# Patient Record
Sex: FEMALE | Race: WHITE | URBAN - METROPOLITAN AREA
[De-identification: names, ages, dates, MRNs, and addresses within clinical notes are randomized per-mention and may not be internally consistent; named-entity substitution may affect disease eponyms.]

---

## 2023-07-24 ENCOUNTER — HOSPITAL ENCOUNTER (OUTPATIENT)
Dept: SPEECH THERAPY | Age: 78
Setting detail: THERAPIES SERIES
Discharge: HOME | End: 2023-07-24
Payer: MEDICARE

## 2023-07-24 PROCEDURE — 92523 SPEECH SOUND LANG COMPREHEN: CPT

## 2023-07-24 NOTE — THERAPY EVALUATION
[address] [facility] Speech Therapy  [address] [place] [address], [address] [facility], [address] [place] [address]  Phone: [phone]   Fax: [phone]     Mrmzbr-Jimfrwgz-Pyzxroohe Evaluation  Speech Therapy Certification      Dear London Woodard DO,    We had the pleasure of evaluating the following patient for speech therapy services at [facility]. A summary of our findings can be found in the initial assessment below. This includes our plan of care. If you have any questions or concerns regarding these findings, please do not hesitate to contact me at the office phone number checked above. Thank you for the referral.       Physician Signature:_______________________________Date:__________________  By signing above (or electronic signature), therapist's plan is approved by physician            Patient: Veronica Kaiser   : 1945   MRN: [id number]  Referring Physician:        Evaluation Date: 2023      Medical Diagnosis Information:  Tomy Cox (ICD-10-CM) - Parkinson's disease  Insurance information: Medicare Parts A and B      Precautions/ Contra-indications: n/a  Latex Allergy:  [x]NO      []YES  Preferred Language for Healthcare:   [x]English       []other:    SUBJECTIVE:  ONSET:  Pt reports Parkinson's diagnosis \"~5 years ago. \"    Recent MRI Brain/Head CT: not available    History/Prior Level of Function:      Current Level of Function:  independent  Living Status: lives with spouse (Leola Coker)  Occupation/School: retired, accounting, associate's degree  Medication Management:  Primary, uses pill organizer  Finance Management:  Primary  Hearing: WFL  Vision: WFL    Relevant Medical History:  [x] Patient history, allergies, meds reviewed. Medical chart reviewed. See intake form. Review Of Systems (ROS):  [x]Performed Review of systems (Integumentary, CardioPulmonary, Neurological) by intake and observation. Intake form has been scanned into medical record.  Patient has been instructed to

## 2023-07-26 ENCOUNTER — HOSPITAL ENCOUNTER (OUTPATIENT)
Dept: OCCUPATIONAL THERAPY | Age: 78
Setting detail: THERAPIES SERIES
Discharge: HOME | End: 2023-07-26
Payer: MEDICARE

## 2023-07-26 ENCOUNTER — HOSPITAL ENCOUNTER (OUTPATIENT)
Dept: PHYSICAL THERAPY | Age: 78
Setting detail: THERAPIES SERIES
Discharge: HOME | End: 2023-07-26
Payer: MEDICARE

## 2023-07-26 PROCEDURE — 97112 NEUROMUSCULAR REEDUCATION: CPT

## 2023-07-26 PROCEDURE — 97530 THERAPEUTIC ACTIVITIES: CPT

## 2023-07-26 PROCEDURE — 97163 PT EVAL HIGH COMPLEX 45 MIN: CPT

## 2023-07-26 PROCEDURE — 97166 OT EVAL MOD COMPLEX 45 MIN: CPT

## 2023-07-26 NOTE — THERAPY EVALUATION
NT  Alternating Toe Tapping:       Impaired    Flexibility     Hamstrings: decreased on R and decreased on L    Functional Mobility    Transitional Movement Assistance Level   Rolling to left side Modified Independent- increased time   Rolling to right side Modified Independent- increased difficulty, incomplete roll   Scooting in bed Modified Independent- increased difficulty   Supine to sit Modified Independent-8.3 sec   Sit to supine Modified Independent-6.5 sec   Sit to stand Modified Independent w/ L UE support, mult attempts w/ wide BARON   Stand to sit Modified Independent   Bed to chair  Modified Independent with stepping/ambulating, festinating, increased time, then progresses to wide BARON     Gait Testing:     Gait   Level of Assistance needed:     Modified Independent  Gait Deviations (firm surface/linoleum):     parkinsonian pattern, freezing any change in surface/color of janice, thresholds, doorways, discontinuous turning, reduced trunk/head rotation, inconsistent sequencing w/ SPC, decreased step length (L shorter than R),   Assistive Device Used:     Single point cane on right    Stairs  Level of Assistance needed:      Modified Independent  Pt able to negotiate up/down 4 stairs:  reciprocal pattern and handrail bilaterally  Assistive Device Used:      no AD  Deficits noted:      Decreased foot clearance on ascent; L side retracted during descent w/ minimal step length and foot clearance      Balance  Static Sitting:  Good   Dynamic Sitting: Good -   Static Standing:  Fair   Dynamic Standing: Fair -    Falls Risk Assessment (30 days):   [] Falls Risk assessed and no intervention required. [x] Falls Risk assessed and Patient requires intervention due to being higher risk   []Tinetti Balance:    Total Score:        Balance:            Gait:         Disability Index:       Risk of Falls:   []Low (> 24)   []Mod (19-23)   []High (< 18)    []10m walk speed        []Functional Gait Assessment

## 2023-07-26 NOTE — THERAPY EVALUATION
[facility] Outpatient Therapy  [address] [employer], [address] [facility], [address] [place] [address]  Phone: [phone]   Fax: [phone]                                               Occupational Therapy Certification    Dear Dr. Susanne Frey DO ,    We had the pleasure of evaluating the following patient for occupational therapy services at [facility]. A summary of our findings can be found in the initial assessment below. This includes our plan of care. If you have any questions or concerns regarding these findings, please do not hesitate to contact me at the office phone number checked above.   Thank you for the referral.         Physician Signature:_______________________________Date:__________________  By signing above (or electronic signature), therapist's plan is approved by physician      Patient: Albino Tidwell   : 1945  MRN: [id number]  Referring Physician: Susanne Frey DO       Evaluation Date: 2023       Medical Diagnosis Information: Parkinson's disease [G20]   Treatment Diagnosis Information: R27.8 (ICD-10-CM) other lack of coordination, R53.1 (ICD-10-CM) Weakness                                       Insurance information: Medicare A & B    Onset Date: ~5 years ago - diagnosed with PD      Follow-Up Appointments: Deep brain stimulator consult with [facility] 2023     Precautions/ Contra-indications: fall risk  Latex Allergy:  [x]NO      []YES   Preferred Language for Healthcare:   [x]English       []other:  C-SSRS Triggered by Intake questionnaire (Past 2 wk assessment):   [x] No, Questionnaire did not trigger screening.   [] Yes, Patient intake triggered further evaluation      [] C-SSRS Screening completed  [] PCP notified via Plan of Care  [] Emergency services notified    SUBJECTIVE:     History of Present Illness:   Pt reported diagnosed with PD approximately 5 years ago with recent decline in function in the past 3 months requiring

## 2023-07-26 NOTE — FLOWSHEET NOTE
[facility] Outpatient Therapy  [address] [place] [address], [address] [facility], [address] [place] [address] Avenue  Phone: [phone]   Fax: [phone]    Occupational Therapy Treatment Note/ Progress Report:     Date:  2023    Patient Name:  Winsome Fernández    :  1945  MRN: [id number]      Medical/Treatment Diagnosis Information:  Parkinson's disease [G20]   R27.8 (ICD-10-CM) other lack of coordination, R53.1 (ICD-10-CM) Weakness           Insurance/Certification information: Medicare A & B   Physician Information: Susanna Woo DO    Plan of care signed:    No    Date of Patient follow up with Physician:      Progress Report: []  Yes  [x]  No     Date Range for reporting period:  Beginnin2023  Ending:      Progress report due:      Recertification due (POC duration/ or 90 days whichever is less): 2023     Visit # Insurance Allowable Auth Needed    BMN No     Latex Allergy:  [x]NO      []YES  Preferred Language for Healthcare:   [x]English       []other:  Functional Scale: UEFS (2021)  -- 57.5/100    Pain level:   Initial:  Post Treatment:     RESTRICTIONS/PRECAUTIONS: fall risk    SUBJECTIVE:  See eval       OBJECTIVE: See eval  Observation:   Test measurements:      DAILY EXERCISES:  Exercises in bold performed in department today. Items not bolded are carried forward from prior visits for continuity of the record. Exercise Repetitions Effort Rating Comments HEP   Sustained Movements (sitting) Floor to Ceiling       [x]    Side to Side       [x]   Multidirectional Repetitive Movements (standing) Forward step/reach            [x]    Sideways step/reach          [x]    Backwards step/reach              [x]    Forward/Backward Rock & Reach          [x]    Side to Side [address]            [x]      [address] [place]       MAXIMAL FUNCTIONAL COMPONENT MOVEMENTS   Functional Task Repetitions Effort Comments   1. Sit to Stand      2.  Handwriting/bed

## 2023-08-07 ENCOUNTER — HOSPITAL ENCOUNTER (OUTPATIENT)
Dept: PHYSICAL THERAPY | Age: 78
Setting detail: THERAPIES SERIES
Discharge: HOME | End: 2023-08-07
Payer: MEDICARE

## 2023-08-07 ENCOUNTER — APPOINTMENT (OUTPATIENT)
Dept: PHYSICAL THERAPY | Age: 78
End: 2023-08-07
Payer: MEDICARE

## 2023-08-07 PROCEDURE — 97530 THERAPEUTIC ACTIVITIES: CPT

## 2023-08-07 NOTE — FLOWSHEET NOTE
Progressing: [] Met: [] Not Met: [] Adjusted  3. Pt will improve 5 time sit to stand to 14.8 sec for reduced fall risk. [] Progressing: [] Met: [] Not Met: [] Adjusted    Long Term Goals: To be achieved in: 5 weeks  1. Pt will improve ABC scale to 50% to aid in reduced fall risk (<69% is predictive of recurrent falls in PD pop). [] Progressing: [] Met: [] Not Met: [] Adjusted  2. Pt to perform transfers via stepping/ambulating with or without cane mod I without freezing >75% trials. [] Progressing: [] Met: [] Not Met: [] Adjusted  3. Pt will ambulate on level surfaces without AD mod I for >300' w/ freezing, <75% of the time when initiating, nearly normal step length, full foot clearance and B arm swing. [] Progressing: [] Met: [] Not Met: [] Adjusted  4. Pt will improve Cox to 50/56 to demonstrate reduced fall risk. [] Progressing: [] Met: [] Not Met: [] Adjusted  5. Pt independent with HEP. [] Progressing: [] Met: [] Not Met: [] Adjusted    Plan:  4 days per week for 4 Weeks (to be divided between PT and OT to address all aspects of POC), will be delayed 1 week in starting POC due to staff scheduling conflicts, pt agreeable.    [x] Continue per plan of care [] Alter current plan (see comments)  [] Plan of care initiated [] Hold pending MD visit [] Discharge        Electronically signed by:  Nasario Alpers, PT,DPT

## 2023-08-08 ENCOUNTER — HOSPITAL ENCOUNTER (OUTPATIENT)
Dept: OCCUPATIONAL THERAPY | Age: 78
Setting detail: THERAPIES SERIES
Discharge: HOME | End: 2023-08-08
Payer: MEDICARE

## 2023-08-08 PROCEDURE — 97112 NEUROMUSCULAR REEDUCATION: CPT

## 2023-08-08 PROCEDURE — 97530 THERAPEUTIC ACTIVITIES: CPT

## 2023-08-08 PROCEDURE — 97110 THERAPEUTIC EXERCISES: CPT

## 2023-08-08 NOTE — FLOWSHEET NOTE
[facility] Outpatient Therapy  [address] [place] [address], [address] [facility], [address] [place] [address] Avenue  Phone: [phone]   Fax: [phone]    Occupational Therapy Treatment Note/ Progress Report:     Date:  2023    Patient Name:  Reece Murray    :  1945  MRN: [id number]      Medical/Treatment Diagnosis Information:  Parkinson's disease [G20]   R27.8 (ICD-10-CM) other lack of coordination, R53.1 (ICD-10-CM) Weakness           Insurance/Certification information: Medicare A & B   Physician Information: Leobardo Muñoz DO    Plan of care signed:    No    Date of Patient follow up with Physician:      Progress Report: []  Yes  [x]  No     Date Range for reporting period:  Beginnin2023  Ending:      Progress report due:      Recertification due (POC duration/ or 90 days whichever is less): 2023     Visit # Insurance Allowable Auth Needed    BMN No     Latex Allergy:  [x]NO      []YES  Preferred Language for Healthcare:   [x]English       []other:  Functional Scale: UEFS (2021)  -- 57.5/100    Pain level:   Initial: 0/10  Post Treatment: 0/10     RESTRICTIONS/PRECAUTIONS: fall risk    SUBJECTIVE:  pt reported being \"worn out last night\"       OBJECTIVE:   Observation: mild-moderate difficulty with trunk and cervical rotation  Test measurements:      DAILY EXERCISES:  Exercises in bold performed in department today. Items not bolded are carried forward from prior visits for continuity of the record. Chair used for standing exercises for UE support     Exercise Repetitions Effort Rating Comments HEP   Sustained Movements (sitting) Floor to Ceiling  8 9 Modeled with patient but no verbal cues needed following initial instruction     [x]    Side to Side  10 5 Difficulty with trunk rotation to the R. Requires assist with shaping.  Difficulty with fully extending back leg     [x]   Multidirectional Repetitive Movements (standing) Forward step/reach     8 8 Mod-max VCs for

## 2023-08-09 ENCOUNTER — HOSPITAL ENCOUNTER (OUTPATIENT)
Dept: PHYSICAL THERAPY | Age: 78
Setting detail: THERAPIES SERIES
Discharge: HOME | End: 2023-08-09
Payer: MEDICARE

## 2023-08-09 PROCEDURE — 97530 THERAPEUTIC ACTIVITIES: CPT

## 2023-08-09 NOTE — FLOWSHEET NOTE
Physical Therapy Treatment Note   Date:  2023    Patient Name:  Foreign Damico  Diagnosis:      :  1945 Treatment Diagnosis:   R26 Abnormality of gait and mobility due to PD   MRN: [id number]        Restrictions/Precautions:  DM2, beta-blocker, tremor and dyskinesias Insurance/Certification information:          Visit# / total visits:  3/17 (9PT/8OT) Missed visits:   0   POC expiration date:  10/26/23 Plan of care signed (Y/N): Y       Initial Pain level: 4/10 Low back  Post Tx Pain Rating:  3/10   Preferred Language for Healthcare:   [x]English       []      Subjective:  Pt reports being a little sore all over from new exercises. Not able to get through all exercises last night, legs \"just give out later in the day\". Objective:    Pt amb w/  parkinsonian pattern, freezing at any change in surface/color of janice, thresholds, doorways, discontinuous turning, reduced trunk/head rotation, inconsistent sequencing w/ SPC, decreased step length (L shorter than R), Single point cane on right      DAILY EXERCISES:  Single UE, sometimes B UE support required for standing exercise + CGA/min A due to posterior LOB. Seated rest break x2   Exercise Repetitions Effort Rating Comments   Sustained Movements (sitting) Floor to Ceiling  8 1     Side to Side  8 each  5 Tactile cues for increased hip and trunk rotation L > R   Multidirectional Repetitive Movements (standing) Forward step/reach     8 each 7 Single UE support, mult LOB, min A required, cues for complete rotation to midline    Sideways step/reach   8 7 CGA, posterior LOB on first 2-3 reps each side.      Backwards step/reach     8 6 singe UE support, min A at hips for weight shift initially and cues to dorsiflex front foot     Forward/Backward [address]   10 each  3 Single UE support throughout     Side to [place]     10 each  5 Cues to increase front LE rotation and complete rotation to front      [address]

## 2023-08-10 ENCOUNTER — APPOINTMENT (OUTPATIENT)
Dept: PHYSICAL THERAPY | Age: 78
End: 2023-08-10
Payer: MEDICARE

## 2023-08-10 ENCOUNTER — HOSPITAL ENCOUNTER (OUTPATIENT)
Dept: OCCUPATIONAL THERAPY | Age: 78
Setting detail: THERAPIES SERIES
Discharge: HOME | End: 2023-08-10
Payer: MEDICARE

## 2023-08-10 PROCEDURE — 97110 THERAPEUTIC EXERCISES: CPT

## 2023-08-10 PROCEDURE — 97112 NEUROMUSCULAR REEDUCATION: CPT

## 2023-08-10 PROCEDURE — 97530 THERAPEUTIC ACTIVITIES: CPT

## 2023-08-10 NOTE — FLOWSHEET NOTE
promoting relaxation,  increasing ROM, reducing/eliminating soft tissue swelling/inflammation/restriction, improving soft tissue extensibility and allowing for proper ROM for normal function with self-care, functional mobility, transfers, reaching and lifting    Modalities:     [] Electrical Stimulation (42895):     [] Ultrasound (07592):    Charges:  Timed Code Treatment Minutes: 55   Total Treatment Minutes: 55       [] EVAL (LOW) 95953 (typically 20 minutes face-to-face)  [] EVAL (MOD) 93816 (typically 30 minutes face-to-face)  [] EVAL (HIGH) 58536 (typically 45 minutes face-to-face)  [] RE-EVAL     [x] DM(63101) x15 (1)   [x] NMR (74718) x15 (1)     [] Manual (72323) x       [x] TA (86556) x25 (2)    [] ES(attended) (08735)       [] ES (un) (82309)  [] Other:    GOALS:  Patient stated goal: improve walking and fine motor control  [] Progressing: [] Met: [] Not Met: [] Adjusted     Therapist goals for Patient:      Short Term Goals: To be achieved in: 3 weeks from today (8/16/2023)  1. Pt will improve score on UEFS to 70/100 for a subjective report of decreased difficulty with completing every day activities with BUE and B hands  [] Progressing: [] Met: [] Not Met: [] Adjusted  2. Pt will be Supervision with HEP/Home activities program to facilitate independence with carryover from sessions and to progress towards returning to PLOF  [] Progressing: [] Met: [] Not Met: [] Adjusted        Long Term Goals: To be achieved in: 6 weeks from today (9/6/2023)  1. Pt will improve score on UEFS to 85/100 for a subjective report of decreased difficulty with completing every day activities with BUE and B hands  [] Progressing: [] Met: [] Not Met: [] Adjusted  2. Pt will improve time on NHPT to more than 30 seconds to demonstrate improved fine motor coordination of L hand  [] Progressing: [] Met: [] Not Met: [] Adjusted  3. Pt will improve typing speed to 10 wpm to demo improved bimanual fine motor control and accuracy.    []

## 2023-08-14 ENCOUNTER — HOSPITAL ENCOUNTER (OUTPATIENT)
Dept: OCCUPATIONAL THERAPY | Age: 78
Setting detail: THERAPIES SERIES
Discharge: HOME | End: 2023-08-14
Payer: MEDICARE

## 2023-08-14 PROCEDURE — 97112 NEUROMUSCULAR REEDUCATION: CPT

## 2023-08-14 PROCEDURE — 97110 THERAPEUTIC EXERCISES: CPT

## 2023-08-14 PROCEDURE — 97530 THERAPEUTIC ACTIVITIES: CPT

## 2023-08-14 NOTE — FLOWSHEET NOTE
and provided handling/verbal cues to promote postural alignment and stability during static and dynamic movement (sitting or standing). Activities may also include visual perceptual training, proprioception training, and balance retraining during functional activities    Manual Treatments:    [] (93478) Provided manual therapy to mobilize UB for the purpose of modulating pain, promoting relaxation,  increasing ROM, reducing/eliminating soft tissue swelling/inflammation/restriction, improving soft tissue extensibility and allowing for proper ROM for normal function with self-care, functional mobility, transfers, reaching and lifting    Modalities:     [] Electrical Stimulation (87030):     [] Ultrasound (29018):    Charges:  Timed Code Treatment Minutes: 60   Total Treatment Minutes: 60       [] EVAL (LOW) 69246 (typically 20 minutes face-to-face)  [] EVAL (MOD) 69566 (typically 30 minutes face-to-face)  [] EVAL (HIGH) 84558 (typically 45 minutes face-to-face)  [] RE-EVAL     [x] IA(96934) x15 (1)  [x] NMR (93724) x15 (1)    [] Manual (53661) x       [x] TA (89768) x30 (2)   [] ES(attended) (28723)       [] ES (un) (45288)  [] Other:    GOALS:  Patient stated goal: improve walking and fine motor control  [] Progressing: [] Met: [] Not Met: [] Adjusted     Therapist goals for Patient:      Short Term Goals: To be achieved in: 3 weeks from today (8/16/2023)  1. Pt will improve score on UEFS to 70/100 for a subjective report of decreased difficulty with completing every day activities with BUE and B hands  [] Progressing: [] Met: [] Not Met: [] Adjusted  2. Pt will be Supervision with HEP/Home activities program to facilitate independence with carryover from sessions and to progress towards returning to PLOF  [] Progressing: [] Met: [] Not Met: [] Adjusted        Long Term Goals: To be achieved in: 6 weeks from today (9/6/2023)  1.  Pt will improve score on UEFS to 85/100 for a subjective report of decreased difficulty

## 2023-08-15 ENCOUNTER — HOSPITAL ENCOUNTER (OUTPATIENT)
Dept: PHYSICAL THERAPY | Age: 78
Setting detail: THERAPIES SERIES
Discharge: HOME | End: 2023-08-15
Payer: MEDICARE

## 2023-08-15 NOTE — CARE COORDINATION
[facility] Outpatient Therapy  [address] [employer], [address] [facility], [address] [place] [address] Avenue  Phone: [phone]   Fax: [phone]    Physical Therapy Missed Visit Note     Date:  8/15/2023    Patient Name:  Foreign Damico      :  1945    MRN: [id number]      Cancelled visits to date: 1-8/15/23  No-shows to date: 0    For today's appointment patient:  [x]  Cancelled  []  Rescheduled appointment  []  No-show     Reason given by patient:  []  Patient ill  []  Conflicting appointment  []  No transportation    []  Conflict with work  [x]  No reason given  []  Other:     Comments:  Plans to keep appt tomorrow.      Electronically signed by:  Alice Sarabia PT, DPT

## 2023-08-16 ENCOUNTER — HOSPITAL ENCOUNTER (OUTPATIENT)
Dept: OCCUPATIONAL THERAPY | Age: 78
Setting detail: THERAPIES SERIES
Discharge: HOME | End: 2023-08-16
Payer: MEDICARE

## 2023-08-16 PROCEDURE — 97112 NEUROMUSCULAR REEDUCATION: CPT

## 2023-08-16 PROCEDURE — 97110 THERAPEUTIC EXERCISES: CPT

## 2023-08-16 PROCEDURE — 97530 THERAPEUTIC ACTIVITIES: CPT

## 2023-08-16 NOTE — PROGRESS NOTES
demonstrate improved fine motor coordination of L hand  [] Progressing: [] Met: [] Not Met: [] Adjusted  3. Pt will improve typing speed to 10 wpm to demo improved bimanual fine motor control and accuracy. [] Progressing: [] Met: [] Not Met: [] Adjusted  4. Pt will be Independent with HEP/Home activities program to facilitate independence with carryover from sessions and to progress towards returning to PLOF  [] Progressing: [] Met: [] Not Met: [] Adjusted  5. Pt will improve supine to sit to no more than 5 seconds to demonstrate improved amplitude of movement patterns and decrease fall risk during bed mobility. [] Progressing: [] Met: [] Not Met: [] Adjusted    ASSESSMENT:    Pt demo'd good response to LSVT BIG daily exercises, functional components and BIG walking this date - responds well to VCs and modeling to improve amplitude of movement patterns. Pt with increased dyskinesias this date which improved as session progressed. Pt cont to require verbal cueing and modeling to maximize movement patterns during LSVT BIG training - pt cont to be limited by bradykinesia and freezing d/t rigidity, weakness, and disease course of PD. Pt cont to benefit from skilled OT, cont per POC. Treatment/Activity Tolerance:  [x] Patient tolerated treatment well [] Patient limited by fatique  [] Patient limited by pain  [] Patient limited by other medical complications  [] Other:     Overall Progression Towards Functional goals/ Treatment Progress Update:  [] Patient is progressing as expected towards functional goals listed. [] Progression is slowed due to complexities/Impairments listed. [] Progression has been slowed due to co-morbidities.   [x] Plan just implemented, too soon to assess goals progression <30days   [] Goals require adjustment due to lack of progress  [] Patient is not progressing as expected and requires additional follow up with physician  [] Other    Prognosis for POC: [x] Good [] Fair  []

## 2023-08-17 ENCOUNTER — HOSPITAL ENCOUNTER (OUTPATIENT)
Dept: PHYSICAL THERAPY | Age: 78
Setting detail: THERAPIES SERIES
Discharge: HOME | End: 2023-08-17
Payer: MEDICARE

## 2023-08-17 PROCEDURE — 97530 THERAPEUTIC ACTIVITIES: CPT

## 2023-08-17 PROCEDURE — 97116 GAIT TRAINING THERAPY: CPT

## 2023-08-17 NOTE — FLOWSHEET NOTE
Physical Therapy Treatment Note   Date:  2023    Patient Name:  Mary Sandy  Diagnosis:      :  1945 Treatment Diagnosis:   R26 Abnormality of gait and mobility due to PD   MRN: [id number]        Restrictions/Precautions:  DM2, beta-blocker, tremor and dyskinesias Insurance/Certification information:          Visit# / total visits:   (9PT/8OT) Missed visits:   0   POC expiration date:  10/26/23 Plan of care signed (Y/N): Y       Initial Pain level: 0/10 Low back  Post Tx Pain Ratin/10   Preferred Language for Healthcare:   [x]English       []      Subjective:  Pt reports her  thinks she is getting better at the exercises. She notices a little difference in her mobility. She did all HEP yesterday except for big walking. Objective:    Pt amb w/  parkinsonian pattern, freezing at any change in surface/color of janice, thresholds, doorways, discontinuous turning, reduced trunk/head rotation, inconsistent sequencing w/ SPC, decreased step length (L shorter than R), Single point cane on right      DAILY EXERCISES:  Single UE, sometimes B UE support required for standing exercise + CGA/min A due to posterior LOB. Seated rest break x2   Exercise Repetitions Effort Rating Comments   Sustained Movements (sitting) Floor to Ceiling  8 2     Side to Side  8 each  3 Cues for ground L foot when rotating and reach arm to increase ext rot   Multidirectional Repetitive Movements (standing) Forward step/reach     8 each 3 Single UE support, LOB, min A required, initially     Sideways step/reach   8 5 CGA, posterior LOB on first  reps each side.  VC to return to midline fully     Backwards step/reach     8 5 single UE support, min A initially at hips for weight shift     Forward/Backward [address]   10 each  4 Single UE support throughout, VC for bigger weight shift and bigger L arm swing     Side to Side Rock [employer] Reach     10 each  4 Single UE support, Cues to increase front LE rotation and

## 2023-08-21 ENCOUNTER — HOSPITAL ENCOUNTER (OUTPATIENT)
Dept: OCCUPATIONAL THERAPY | Age: 78
Setting detail: THERAPIES SERIES
Discharge: HOME | End: 2023-08-21
Payer: MEDICARE

## 2023-08-21 ENCOUNTER — APPOINTMENT (OUTPATIENT)
Dept: PHYSICAL THERAPY | Age: 78
End: 2023-08-21
Payer: MEDICARE

## 2023-08-21 PROCEDURE — 97110 THERAPEUTIC EXERCISES: CPT

## 2023-08-21 PROCEDURE — 97530 THERAPEUTIC ACTIVITIES: CPT

## 2023-08-21 PROCEDURE — 97112 NEUROMUSCULAR REEDUCATION: CPT

## 2023-08-21 NOTE — FLOWSHEET NOTE
The [facility] Outpatient Therapy  [address], [address] [facility], [address] [place] [address] Avenue  Phone: [phone]   Fax: [phone]    Occupational Therapy Treatment Note/ Progress Report:     Date:  2023    Patient Name:  Bridget Olson    :  1945  MRN: [id number]      Medical/Treatment Diagnosis Information:  Parkinson's disease [G20]   R27.8 (ICD-10-CM) other lack of coordination, R53.1 (ICD-10-CM) Weakness           Insurance/Certification information: Medicare A & B   Physician Information: Ashli Diop DO    Plan of care signed:    Yes    Date of Patient follow up with Physician:      Progress Report: []  Yes  [x]  No     Date Range for reporting period:  Beginnin2023  Ending:      Progress report due:      Recertification due (POC duration/ or 90 days whichever is less): 2023     Visit # Insurance Allowable Auth Needed    BMN No     Latex Allergy:  [x]NO      []YES  Preferred Language for Healthcare:   [x]English       []other:  Functional Scale: UEFS (2021)  -- 57.5/100  Functional Scale: UEFS (2021)  -- 60/100    Pain level:   Initial: 0/10  Post Treatment: 0/10     RESTRICTIONS/PRECAUTIONS: fall risk    SUBJECTIVE: reported fall yesterday while in kitchen - sustained abrasion on R elbow and has bruising today with open, non-bleeding wound. Denied hitting head. Was able to get up on own - was home alone. Medical alert contacted pt has they were notified of fall - did not seek medical care. Pt stated she did not have a good weekend - stated legs feel \"jelly\". OBJECTIVE:   Observation: multiple bouts of unsteadiness during BIG walking and stair climbing this date - provided CGA to increase safety  Test measurements:  UEFS: 60/100 (2023)    DAILY EXERCISES:  Exercises in bold performed in department today. Items not bolded are carried forward from prior visits for continuity of the record.  Chair used for standing exercises for UE

## 2023-08-22 ENCOUNTER — HOSPITAL ENCOUNTER (OUTPATIENT)
Dept: OCCUPATIONAL THERAPY | Age: 78
Setting detail: THERAPIES SERIES
Discharge: HOME | End: 2023-08-22
Payer: MEDICARE

## 2023-08-22 NOTE — CARE COORDINATION
The [facility] Outpatient Therapy  [address], [address] [facility], [address] [place] [address] Avenue  Phone: [phone]   Fax: [phone]    Occupational Therapy Missed Visit Note     Date:  2023    Patient Name:  Hazel Asencio      :  1945    MRN: [id number]      Cancelled visits to date: 1 (2023)  No-shows to date: 0    For today's appointment patient:  [x]  Cancelled  []  Rescheduled appointment  []  No-show     Reason given by patient:  []  Patient ill  []  Conflicting appointment  []  No transportation    []  Conflict with work  []  No reason given  [x]  Other:     Comments:  Pt called to request all future appointments be cancelled. She would like to \"drop out\" of the [address] [place] [address] program because she is having too much trouble not feeling well, being edgy, shaky and legs not working. She has a follow up w/ her doctor and an evaluation for a DBS next week. She plans to continue the HEP as able and would like to return at a future date.      Electronically signed by:  Edie Vazquez OT

## 2023-08-22 NOTE — CARE COORDINATION
The [facility] Outpatient Therapy  [address], [address] [facility], [address] [place] [address] Avenue  Phone: [phone]   Fax: [phone]    Physical Therapy Missed Visit Note     Date:  2023    Patient Name:  Jena Gil      :  1945    MRN: [id number]      Cancelled visits to date: 2-8/15/23, 23  No-shows to date: 0    For today's appointment patient:  [x]  Cancelled  []  Rescheduled appointment  []  No-show     Reason given by patient:  []  Patient ill  []  Conflicting appointment  []  No transportation    []  Conflict with work  []  No reason given  [x]  Other:     Comments:  Pt called to request all future appointments be cancelled. She would like to \"drop out\" of the [address] [place] [address] program because she is having too much trouble not feeling well, being edgy, shaky and legs not working. She has a follow up w/ her doctor and an evaluation for a DBS next week. She plans to continue the HEP as able and would like to return at a future date.       Electronically signed by:  Evangelina Solis, PT, DPT

## 2023-08-23 ENCOUNTER — HOSPITAL ENCOUNTER (OUTPATIENT)
Dept: PHYSICAL THERAPY | Age: 78
Setting detail: THERAPIES SERIES
Discharge: HOME | End: 2023-08-23
Payer: MEDICARE

## 2023-08-24 ENCOUNTER — HOSPITAL ENCOUNTER (OUTPATIENT)
Dept: OCCUPATIONAL THERAPY | Age: 78
Setting detail: THERAPIES SERIES
End: 2023-08-24
Payer: MEDICARE

## 2023-08-28 ENCOUNTER — APPOINTMENT (OUTPATIENT)
Dept: PHYSICAL THERAPY | Age: 78
End: 2023-08-28
Payer: MEDICARE

## 2023-08-29 ENCOUNTER — APPOINTMENT (OUTPATIENT)
Dept: OCCUPATIONAL THERAPY | Age: 78
End: 2023-08-29
Payer: MEDICARE

## 2023-08-30 ENCOUNTER — APPOINTMENT (OUTPATIENT)
Dept: PHYSICAL THERAPY | Age: 78
End: 2023-08-30
Payer: MEDICARE

## 2023-08-30 ENCOUNTER — APPOINTMENT (OUTPATIENT)
Dept: OCCUPATIONAL THERAPY | Age: 78
End: 2023-08-30
Payer: MEDICARE

## 2023-08-31 ENCOUNTER — APPOINTMENT (OUTPATIENT)
Dept: OCCUPATIONAL THERAPY | Age: 78
End: 2023-08-31
Payer: MEDICARE

## 2023-08-31 ENCOUNTER — APPOINTMENT (OUTPATIENT)
Dept: PHYSICAL THERAPY | Age: 78
End: 2023-08-31
Payer: MEDICARE